# Patient Record
Sex: FEMALE | Race: WHITE | Employment: FULL TIME | URBAN - METROPOLITAN AREA
[De-identification: names, ages, dates, MRNs, and addresses within clinical notes are randomized per-mention and may not be internally consistent; named-entity substitution may affect disease eponyms.]

---

## 2017-08-03 ENCOUNTER — OFFICE VISIT (OUTPATIENT)
Dept: FAMILY MEDICINE CLINIC | Age: 37
End: 2017-08-03

## 2017-08-03 VITALS
BODY MASS INDEX: 40.22 KG/M2 | HEART RATE: 73 BPM | DIASTOLIC BLOOD PRESSURE: 76 MMHG | OXYGEN SATURATION: 98 % | TEMPERATURE: 98.7 F | HEIGHT: 63 IN | RESPIRATION RATE: 16 BRPM | WEIGHT: 227 LBS | SYSTOLIC BLOOD PRESSURE: 114 MMHG

## 2017-08-03 DIAGNOSIS — Z00.00 PHYSICAL EXAM: Primary | ICD-10-CM

## 2017-08-03 NOTE — PATIENT INSTRUCTIONS

## 2017-08-03 NOTE — PROGRESS NOTES
Eleni Mathew is a 42 y.o.  female and presents with screening physical. Hx of total hysterectomy for fibroids. Chief Complaint   Patient presents with   [address] [place] [address]     Subjective: Additional Concerns: none     Current Outpatient Prescriptions   Medication Sig Dispense Refill    therapeutic multivitamin (THERAGRAN) tablet Take 1 Tab by mouth daily. Patient agreed on 2013 to hold this medication until after surgery.  polyethylene glycol (MIRALAX) 17 gram packet Take 17 g by mouth as needed.  Indications: CONSTIPATION       Allergies   Allergen Reactions    Adhesive Tape-Silicones Rash    Pcn [Penicillins] Rash    Sulfa (Sulfonamide Antibiotics) Hives     Past Medical History:   Diagnosis Date    Chronic pain     abd pain, need surgery    Liver disease     acute hepatitis, jaundice, treated     Past Surgical History:   Procedure Laterality Date    HX GYN           HX ORTHOPAEDIC      (R) foot surg - bone spurs    HX OTHER SURGICAL      liver bx     Family History   Problem Relation Age of Onset    Elevated Lipids Mother     Hypertension Mother     Diabetes Mother     Heart Disease Father     Elevated Lipids Father     Hypertension Father     Stroke Maternal Grandmother     Heart Disease Paternal Grandmother     Heart Disease Paternal Grandfather     Alcohol abuse Paternal Grandfather      Social History   Substance Use Topics    Smoking status: Former Smoker    Smokeless tobacco: Not on file      Comment: smoking cess 10 years ago    Alcohol use No     ROS     General: negative for - chills, fatigue, fever, weight change  Psych: negative for - anxiety, depression, irritability or mood swings  ENT: negative for - headaches, hearing change, nasal congestion, oral lesions, sneezing or sore throat  Heme/ Lymph: negative for - bleeding problems, bruising, pallor or swollen lymph nodes  Endo: negative for - hot flashes, polydipsia/polyuria or temperature intolerance  Resp: negative for - cough, shortness of breath or wheezing  CV: negative for - chest pain, edema or palpitations  GI: negative for - abdominal pain, change in bowel habits, constipation, diarrhea or nausea/vomiting  : negative for - dysuria, hematuria, incontinence, pelvic pain or vulvar/vaginal symptoms  MSK: negative for - joint pain, joint swelling or muscle pain  Neuro: negative for - confusion, headaches, seizures or weakness  Derm: negative for - dry skin, hair changes, rash or skin lesion changes    Objective:  Vitals:    08/03/17 1626   BP: 114/76   Pulse: 73   Resp: 16   Temp: 98.7 °F (37.1 °C)   TempSrc: Oral   SpO2: 98%   Weight: 227 lb (103 kg)   Height: 5' 3\" (1.6 m)   PainSc:   0 - No pain   LMP: 06/15/2013     PE    alert, well appearing, and in no distress, oriented to person, place, and time, normal appearing weight and overweight  General appearance - alert, well appearing, and in no distress, oriented to person, place, and time and overweight  Mental status - alert, oriented to person, place, and time, normal mood, behavior, speech, dress, motor activity, and thought processes  Eyes - pupils equal and reactive, extraocular eye movements intact  Ears - bilateral TM's and external ear canals normal  Mouth - mucous membranes moist, pharynx normal without lesions  Neck - supple, no significant adenopathy  Chest - clear to auscultation, no wheezes, rales or rhonchi, symmetric air entry  Heart - normal rate, regular rhythm, normal S1, S2, no murmurs, rubs, clicks or gallops  Abdomen - soft, nontender, nondistended, no masses or organomegaly  Back exam - full range of motion, no tenderness, palpable spasm or pain on motion  Neurological - alert, oriented, normal speech, no focal findings or movement disorder noted  Musculoskeletal - no joint tenderness, deformity or swelling  Extremities - peripheral pulses normal, no pedal edema, no clubbing or cyanosis  Skin - normal coloration and turgor, no rashes, no suspicious skin lesions noted    LABS   No visits with results within 6 Month(s) from this visit. Latest known visit with results is:    Admission on 07/03/2013, Discharged on 07/04/2013   Component Date Value Ref Range Status    Pregnancy test,urine (POC) 07/03/2013 NEGATIVE   NEGATIVE Final    WBC 07/03/2013 15.3* 4.6 - 13.2 K/uL Final    RBC 07/03/2013 4.25  4.20 - 5.30 M/uL Final    HGB 07/03/2013 13.1  12.0 - 16.0 g/dL Final    HCT 07/03/2013 38.8  35.0 - 45.0 % Final    MCV 07/03/2013 91.3  74.0 - 97.0 FL Final    MCH 07/03/2013 30.8  24.0 - 34.0 PG Final    MCHC 07/03/2013 33.8  31.0 - 37.0 g/dL Final    RDW 07/03/2013 14.5  11.6 - 14.5 % Final    PLATELET 98/75/8132 618  135 - 420 K/uL Final    MPV 07/03/2013 9.8  9.2 - 11.8 FL Final       TESTS  Results for orders placed or performed during the hospital encounter of 07/03/13   CBC W/O DIFF   Result Value Ref Range    WBC 15.3 (H) 4.6 - 13.2 K/uL    RBC 4.25 4.20 - 5.30 M/uL    HGB 13.1 12.0 - 16.0 g/dL    HCT 38.8 35.0 - 45.0 %    MCV 91.3 74.0 - 97.0 FL    MCH 30.8 24.0 - 34.0 PG    MCHC 33.8 31.0 - 37.0 g/dL    RDW 14.5 11.6 - 14.5 %    PLATELET 658 766 - 653 K/uL    MPV 9.8 9.2 - 11.8 FL   HCG URINE, QL. - POC   Result Value Ref Range    Pregnancy test,urine (POC) NEGATIVE  NEGATIVE     Assessment/Plan:       Physical exam  - METABOLIC PANEL, COMPREHENSIVE; Future  - LIPID PANEL; Future  - CBC WITH AUTOMATED DIFF; Future  - TSH 3RD GENERATION; Future  - HEMOGLOBIN A1C WITH EAG; Future    Lab review: orders written for new lab studies as appropriate; see orders. We will call for results and further plan. I have discussed the diagnosis with the patient and the intended plan as seen in the above orders. The patient has received an after-visit summary and questions were answered concerning future plans. I have discussed medication side effects and warnings with the patient as well. I have reviewed the plan of care with the patient, accepted their input and they are in agreement with the treatment goals. Follow-up Disposition:  Return if symptoms worsen or fail to improve.     Astrid Jauregui MD

## 2017-08-03 NOTE — PROGRESS NOTES
Robinson Mckeon is a 42 y.o.  female and presents with need for screening physical. Patient had a full hysterectomy for   Bleeding fibroids. Chief Complaint   Patient presents with   [address] [place] [address]     Subjective: Additional Concerns: none    Current Outpatient Prescriptions   Medication Sig Dispense Refill    therapeutic multivitamin (THERAGRAN) tablet Take 1 Tab by mouth daily. Patient agreed on 2013 to hold this medication until after surgery.  polyethylene glycol (MIRALAX) 17 gram packet Take 17 g by mouth as needed.  Indications: CONSTIPATION       Allergies   Allergen Reactions    Adhesive Tape-Silicones Rash    Pcn [Penicillins] Rash    Sulfa (Sulfonamide Antibiotics) Hives     Past Medical History:   Diagnosis Date    Chronic pain     abd pain, need surgery    Liver disease     acute hepatitis, jaundice, treated     Past Surgical History:   Procedure Laterality Date    HX GYN           HX ORTHOPAEDIC      (R) foot surg - bone spurs    HX OTHER SURGICAL      liver bx     Family History   Problem Relation Age of Onset    Elevated Lipids Mother     Hypertension Mother     Diabetes Mother     Heart Disease Father     Elevated Lipids Father     Hypertension Father     Stroke Maternal Grandmother     Heart Disease Paternal Grandmother     Heart Disease Paternal Grandfather     Alcohol abuse Paternal Grandfather      Social History   Substance Use Topics    Smoking status: Former Smoker    Smokeless tobacco: Not on file      Comment: smoking cess 10 years ago    Alcohol use No     ROS     General: negative for - chills, fatigue, fever, weight change  Psych: negative for - anxiety, depression, irritability or mood swings  ENT: negative for - headaches, hearing change, nasal congestion, oral lesions, sneezing or sore throat  Heme/ Lymph: negative for - bleeding problems, bruising, pallor or swollen lymph nodes  Endo: negative for - hot flashes, polydipsia/polyuria or temperature intolerance  Resp: negative for - cough, shortness of breath or wheezing  CV: negative for - chest pain, edema or palpitations  GI: negative for - abdominal pain, change in bowel habits, constipation, diarrhea or nausea/vomiting  : negative for - dysuria, hematuria, incontinence, pelvic pain or vulvar/vaginal symptoms  MSK: negative for - joint pain, joint swelling or muscle pain  Neuro: negative for - confusion, headaches, seizures or weakness  Derm: negative for - dry skin, hair changes, rash or skin lesion changes    Objective:  Vitals:    08/03/17 1626   BP: 114/76   Pulse: 73   Resp: 16   Temp: 98.7 °F (37.1 °C)   TempSrc: Oral   SpO2: 98%   Weight: 227 lb (103 kg)   Height: 5' 3\" (1.6 m)   PainSc:   0 - No pain   LMP: 06/15/2013     PE    alert, well appearing, and in no distress, oriented to person, place, and time and overweight  Mental status - alert, oriented to person, place, and time, normal mood, behavior, speech, dress, motor activity, and thought processes  Eyes - pupils equal and reactive, extraocular eye movements intact  Ears - bilateral TM's and external ear canals normal  Mouth - mucous membranes moist, pharynx normal without lesions  Neck - supple, no significant adenopathy  Chest - clear to auscultation, no wheezes, rales or rhonchi, symmetric air entry  Heart - normal rate, regular rhythm, normal S1, S2, no murmurs, rubs, clicks or gallops  Abdomen - soft, nontender, nondistended, no masses or organomegaly  Back exam - full range of motion, no tenderness, palpable spasm or pain on motion  Neurological - alert, oriented, normal speech, no focal findings or movement disorder noted  Musculoskeletal - no joint tenderness, deformity or swelling  Extremities - peripheral pulses normal, no pedal edema, no clubbing or cyanosis  Skin - normal coloration and turgor, no rashes, no suspicious skin lesions noted    LABS   No visits with results within 6 Month(s) from this visit. Latest known visit with results is:    Admission on 07/03/2013, Discharged on 07/04/2013   Component Date Value Ref Range Status    Pregnancy test,urine (POC) 07/03/2013 NEGATIVE   NEGATIVE Final    WBC 07/03/2013 15.3* 4.6 - 13.2 K/uL Final    RBC 07/03/2013 4.25  4.20 - 5.30 M/uL Final    HGB 07/03/2013 13.1  12.0 - 16.0 g/dL Final    HCT 07/03/2013 38.8  35.0 - 45.0 % Final    MCV 07/03/2013 91.3  74.0 - 97.0 FL Final    MCH 07/03/2013 30.8  24.0 - 34.0 PG Final    MCHC 07/03/2013 33.8  31.0 - 37.0 g/dL Final    RDW 07/03/2013 14.5  11.6 - 14.5 % Final    PLATELET 80/42/1420 182  135 - 420 K/uL Final    MPV 07/03/2013 9.8  9.2 - 11.8 FL Final       TESTS  Results for orders placed or performed during the hospital encounter of 07/03/13   CBC W/O DIFF   Result Value Ref Range    WBC 15.3 (H) 4.6 - 13.2 K/uL    RBC 4.25 4.20 - 5.30 M/uL    HGB 13.1 12.0 - 16.0 g/dL    HCT 38.8 35.0 - 45.0 %    MCV 91.3 74.0 - 97.0 FL    MCH 30.8 24.0 - 34.0 PG    MCHC 33.8 31.0 - 37.0 g/dL    RDW 14.5 11.6 - 14.5 %    PLATELET 204 178 - 653 K/uL    MPV 9.8 9.2 - 11.8 FL   HCG URINE, QL. - POC   Result Value Ref Range    Pregnancy test,urine (POC) NEGATIVE  NEGATIVE     Assessment/Plan:      1. Physical exam - Labs as ordered. Lab review: orders written for new lab studies as appropriate; see orders. We will call patient for results and further plan if any. I have discussed the diagnosis with the patient and the intended plan as seen in the above orders. The patient has received an after-visit summary and questions were answered concerning future plans. I have discussed medication side effects and warnings with the patient as well. I have reviewed the plan of care with the patient, accepted their input and they are in agreement with the treatment goals.      F/U as needed    Sheri Jimenez MD

## 2017-08-08 ENCOUNTER — HOSPITAL ENCOUNTER (OUTPATIENT)
Dept: LAB | Age: 37
Discharge: HOME | End: 2017-08-08
Payer: OTHER GOVERNMENT

## 2017-08-08 DIAGNOSIS — Z00.00 PHYSICAL EXAM: ICD-10-CM

## 2017-08-08 LAB
ALBUMIN SERPL BCP-MCNC: 3.3 G/DL (ref 3.4–5)
ALBUMIN/GLOB SERPL: 0.8 {RATIO} (ref 0.8–1.7)
ALP SERPL-CCNC: 80 U/L (ref 45–117)
ALT SERPL-CCNC: 63 U/L (ref 13–56)
ANION GAP BLD CALC-SCNC: 7 MMOL/L (ref 3–18)
AST SERPL W P-5'-P-CCNC: 38 U/L (ref 15–37)
BASOPHILS # BLD AUTO: 0 K/UL (ref 0–0.06)
BASOPHILS # BLD: 0 % (ref 0–2)
BILIRUB SERPL-MCNC: 0.7 MG/DL (ref 0.2–1)
BUN SERPL-MCNC: 9 MG/DL (ref 7–18)
BUN/CREAT SERPL: 12 (ref 12–20)
CALCIUM SERPL-MCNC: 9 MG/DL (ref 8.5–10.1)
CHLORIDE SERPL-SCNC: 106 MMOL/L (ref 100–108)
CHOLEST SERPL-MCNC: 176 MG/DL
CO2 SERPL-SCNC: 27 MMOL/L (ref 21–32)
CREAT SERPL-MCNC: 0.76 MG/DL (ref 0.6–1.3)
DIFFERENTIAL METHOD BLD: NORMAL
EOSINOPHIL # BLD: 0.2 K/UL (ref 0–0.4)
EOSINOPHIL NFR BLD: 3 % (ref 0–5)
ERYTHROCYTE [DISTWIDTH] IN BLOOD BY AUTOMATED COUNT: 13.4 % (ref 11.6–14.5)
EST. AVERAGE GLUCOSE BLD GHB EST-MCNC: 114 MG/DL
GLOBULIN SER CALC-MCNC: 3.9 G/DL (ref 2–4)
GLUCOSE SERPL-MCNC: 89 MG/DL (ref 74–99)
HBA1C MFR BLD: 5.6 % (ref 4.2–5.6)
HCT VFR BLD AUTO: 40.8 % (ref 35–45)
HDLC SERPL-MCNC: 57 MG/DL (ref 40–60)
HDLC SERPL: 3.1 {RATIO} (ref 0–5)
HGB BLD-MCNC: 13.2 G/DL (ref 12–16)
LDLC SERPL CALC-MCNC: 95.6 MG/DL (ref 0–100)
LIPID PROFILE,FLP: NORMAL
LYMPHOCYTES # BLD AUTO: 36 % (ref 21–52)
LYMPHOCYTES # BLD: 2.9 K/UL (ref 0.9–3.6)
MCH RBC QN AUTO: 28.4 PG (ref 24–34)
MCHC RBC AUTO-ENTMCNC: 32.4 G/DL (ref 31–37)
MCV RBC AUTO: 87.9 FL (ref 74–97)
MONOCYTES # BLD: 0.7 K/UL (ref 0.05–1.2)
MONOCYTES NFR BLD AUTO: 9 % (ref 3–10)
NEUTS SEG # BLD: 4.1 K/UL (ref 1.8–8)
NEUTS SEG NFR BLD AUTO: 52 % (ref 40–73)
PLATELET # BLD AUTO: 254 K/UL (ref 135–420)
PMV BLD AUTO: 10.3 FL (ref 9.2–11.8)
POTASSIUM SERPL-SCNC: 3.8 MMOL/L (ref 3.5–5.5)
PROT SERPL-MCNC: 7.2 G/DL (ref 6.4–8.2)
RBC # BLD AUTO: 4.64 M/UL (ref 4.2–5.3)
SODIUM SERPL-SCNC: 140 MMOL/L (ref 136–145)
TRIGL SERPL-MCNC: 117 MG/DL (ref ?–150)
TSH SERPL DL<=0.05 MIU/L-ACNC: 1.93 UIU/ML (ref 0.36–3.74)
VLDLC SERPL CALC-MCNC: 23.4 MG/DL
WBC # BLD AUTO: 7.9 K/UL (ref 4.6–13.2)

## 2017-08-08 PROCEDURE — 83036 HEMOGLOBIN GLYCOSYLATED A1C: CPT | Performed by: FAMILY MEDICINE

## 2017-08-08 PROCEDURE — 80053 COMPREHEN METABOLIC PANEL: CPT | Performed by: FAMILY MEDICINE

## 2017-08-08 PROCEDURE — 85025 COMPLETE CBC W/AUTO DIFF WBC: CPT | Performed by: FAMILY MEDICINE

## 2017-08-08 PROCEDURE — 84443 ASSAY THYROID STIM HORMONE: CPT | Performed by: FAMILY MEDICINE

## 2017-08-08 PROCEDURE — 80061 LIPID PANEL: CPT | Performed by: FAMILY MEDICINE

## 2017-08-11 NOTE — PROGRESS NOTES
Pls inform patient labs normal to near normal lg liver enzymes almost normal now. F/U in 6-12 months.

## 2017-08-17 ENCOUNTER — TELEPHONE (OUTPATIENT)
Dept: FAMILY MEDICINE CLINIC | Age: 37
End: 2017-08-17

## 2017-08-17 NOTE — TELEPHONE ENCOUNTER
----- Message from David Rosen MD sent at 8/11/2017  8:42 AM EDT -----  Pls inform patient labs normal to near normal lg liver enzymes almost normal now. F/U in 6-12 months.

## 2018-09-04 ENCOUNTER — HOSPITAL ENCOUNTER (OUTPATIENT)
Dept: LAB | Age: 38
Discharge: HOME | End: 2018-09-04
Payer: OTHER GOVERNMENT

## 2018-09-04 ENCOUNTER — OFFICE VISIT (OUTPATIENT)
Dept: FAMILY MEDICINE CLINIC | Age: 38
End: 2018-09-04

## 2018-09-04 VITALS
WEIGHT: 239 LBS | DIASTOLIC BLOOD PRESSURE: 80 MMHG | BODY MASS INDEX: 42.35 KG/M2 | HEIGHT: 63 IN | SYSTOLIC BLOOD PRESSURE: 104 MMHG | OXYGEN SATURATION: 96 % | TEMPERATURE: 97.3 F | RESPIRATION RATE: 16 BRPM | HEART RATE: 82 BPM

## 2018-09-04 DIAGNOSIS — Z00.00 ANNUAL PHYSICAL EXAM: ICD-10-CM

## 2018-09-04 DIAGNOSIS — Z00.00 ANNUAL PHYSICAL EXAM: Primary | ICD-10-CM

## 2018-09-04 PROBLEM — E66.01 OBESITY, MORBID (HCC): Status: ACTIVE | Noted: 2018-09-04

## 2018-09-04 LAB
ALBUMIN SERPL-MCNC: 3.5 G/DL (ref 3.4–5)
ALBUMIN/GLOB SERPL: 0.9 {RATIO} (ref 0.8–1.7)
ALP SERPL-CCNC: 91 U/L (ref 45–117)
ALT SERPL-CCNC: 46 U/L (ref 13–56)
ANION GAP SERPL CALC-SCNC: 7 MMOL/L (ref 3–18)
AST SERPL-CCNC: 29 U/L (ref 15–37)
BILIRUB SERPL-MCNC: 0.6 MG/DL (ref 0.2–1)
BUN SERPL-MCNC: 11 MG/DL (ref 7–18)
BUN/CREAT SERPL: 14 (ref 12–20)
CALCIUM SERPL-MCNC: 8.6 MG/DL (ref 8.5–10.1)
CHLORIDE SERPL-SCNC: 107 MMOL/L (ref 100–108)
CHOLEST SERPL-MCNC: 197 MG/DL
CO2 SERPL-SCNC: 27 MMOL/L (ref 21–32)
CREAT SERPL-MCNC: 0.78 MG/DL (ref 0.6–1.3)
ERYTHROCYTE [DISTWIDTH] IN BLOOD BY AUTOMATED COUNT: 13.7 % (ref 11.6–14.5)
GLOBULIN SER CALC-MCNC: 4 G/DL (ref 2–4)
GLUCOSE SERPL-MCNC: 87 MG/DL (ref 74–99)
HCT VFR BLD AUTO: 40.5 % (ref 35–45)
HDLC SERPL-MCNC: 55 MG/DL (ref 40–60)
HDLC SERPL: 3.6 {RATIO} (ref 0–5)
HGB BLD-MCNC: 13.3 G/DL (ref 12–16)
LDLC SERPL CALC-MCNC: 122 MG/DL (ref 0–100)
LIPID PROFILE,FLP: ABNORMAL
MCH RBC QN AUTO: 29.2 PG (ref 24–34)
MCHC RBC AUTO-ENTMCNC: 32.8 G/DL (ref 31–37)
MCV RBC AUTO: 89 FL (ref 74–97)
PLATELET # BLD AUTO: 256 K/UL (ref 135–420)
PMV BLD AUTO: 10.1 FL (ref 9.2–11.8)
POTASSIUM SERPL-SCNC: 4 MMOL/L (ref 3.5–5.5)
PROT SERPL-MCNC: 7.5 G/DL (ref 6.4–8.2)
RBC # BLD AUTO: 4.55 M/UL (ref 4.2–5.3)
SODIUM SERPL-SCNC: 141 MMOL/L (ref 136–145)
TRIGL SERPL-MCNC: 100 MG/DL (ref ?–150)
VLDLC SERPL CALC-MCNC: 20 MG/DL
WBC # BLD AUTO: 7.7 K/UL (ref 4.6–13.2)

## 2018-09-04 PROCEDURE — 80053 COMPREHEN METABOLIC PANEL: CPT | Performed by: PHYSICIAN ASSISTANT

## 2018-09-04 PROCEDURE — 36415 COLL VENOUS BLD VENIPUNCTURE: CPT | Performed by: PHYSICIAN ASSISTANT

## 2018-09-04 PROCEDURE — 80061 LIPID PANEL: CPT | Performed by: PHYSICIAN ASSISTANT

## 2018-09-04 PROCEDURE — 85027 COMPLETE CBC AUTOMATED: CPT | Performed by: PHYSICIAN ASSISTANT

## 2018-09-04 NOTE — MR AVS SNAPSHOT
[address] [place] [id number] 
[phone] Patient: Devante Gordon MRN: [id number] DQT:[birth year] Visit Information Date & Time Provider Department Dept. Phone Encounter #  
 9/4/2018  8:30 AM Mian Santos, [address] [facility] [phone] [id number] Upcoming Health Maintenance Date Due DTaP/Tdap/Td series (1 - Tdap) 8/3/1999 PAP AKA CERVICAL CYTOLOGY 8/3/1999 Influenza Age 5 to Adult 8/1/2018 Allergies as of 9/4/2018  Review Complete On: 8/3/2017 By: Katherin Lockett LPN Severity Noted Reaction Type Reactions Adhesive Tape-silicones  25/85/8303   Intolerance Rash Pcn [Penicillins]  06/26/2013   Side Effect Rash  
 Sulfa (Sulfonamide Antibiotics)  06/26/2013   Side Effect Hives Current Immunizations  Never Reviewed No immunizations on file. Not reviewed this visit You Were Diagnosed With   
  
 Codes Comments Annual physical exam    -  Primary ICD-10-CM: Z00.00 ICD-9-CM: V70.0 Vitals LMP OB Status Smoking Status 06/16/2013 Hysterectomy Former Smoker Your Updated Medication List  
  
   
This list is accurate as of 9/4/18  9:12 AM.  Always use your most recent med list.  
  
  
  
  
 Freddie Deck 17 gram packet Generic drug:  polyethylene glycol Take 17 g by mouth as needed. Indications: CONSTIPATION  
  
 therapeutic multivitamin tablet Commonly known as:  [facility] Take 1 Tab by mouth daily. Patient agreed on June 26, 2013 to hold this medication until after surgery. To-Do List   
 09/04/2018 Lab:  CBC W/O DIFF   
  
 09/04/2018 Lab:  LIPID PANEL   
  
 09/04/2018 Lab:  METABOLIC PANEL, COMPREHENSIVE Introducing [facility]! Dear Latonya: 
Thank you for requesting a [facility] account.   Our records indicate that you already have an active [facility] account. You can access your account anytime at [email or web address] Did you know that you can access your hospital and ER discharge instructions at any time in [facility]? You can also review all of your test results from your hospital stay or ER visit. Additional Information If you have questions, please visit the Frequently Asked Questions section of the [facility] website at [email or web address] Remember, [facility] is NOT to be used for urgent needs. For medical emergencies, dial 911. Now available from your iPhone and Android! Please provide this summary of care documentation to your next provider. Your primary care clinician is listed as Haydee Carias. If you have any questions after today's visit, please call [phone].

## 2018-09-04 NOTE — PROGRESS NOTES
History of Present Illness Patient presents for complete physical and has no complaints. Denies any pertinent PMH, not on any daily medications, or had any recent illnesses. Denies any CP or SOB with physical activity. Past Medical History:  
Diagnosis Date  Chronic pain   
 abd pain, need surgery  Liver disease   
 acute hepatitis, jaundice, treated Current Outpatient Prescriptions on File Prior to Visit Medication Sig Dispense Refill  therapeutic multivitamin (THERAGRAN) tablet Take 1 Tab by mouth daily. Patient agreed on June 26, 2013 to hold this medication until after surgery.  polyethylene glycol (MIRALAX) 17 gram packet Take 17 g by mouth as needed. Indications: CONSTIPATION No current facility-administered medications on file prior to visit. Review of Systems General: negative for - chills, fatigue or fever Psychological: negative for - anxiety or depression Ophthalmic: negative for - double vision, photophobia or uses glasses ENT: negative for - epistaxis, headaches, vertigo or visual changes Hematological and Lymphatic: negative for - bleeding problems, night sweats or swollen lymph nodes Endocrine: negative for - breast changes, malaise/lethargy, palpitations or polydipsia/polyuria Respiratory: no cough, shortness of breath, or wheezing Cardiovascular: no chest pain or dyspnea on exertion Gastrointestinal: no abdominal pain, change in bowel habits Genito-Urinary: no dysuria, trouble voiding, or hematuria Musculoskeletal: negative for - muscular weakness Dermatological: negative for - eczema or rash Objective Visit Vitals  /80 (BP 1 Location: Left arm, BP Patient Position: Sitting)  Pulse 82  Temp 97.3 °F (36.3 °C) (Oral)  Resp 16  
 Ht 5' 3\" (1.6 m)  Wt 239 lb (108.4 kg)  LMP 06/16/2013  SpO2 96%  BMI 42.34 kg/m2 Physical Exam 
Physical Examination: General appearance - alert, well appearing, and in no distress Mental status - alert, oriented to person, place, and time Eyes - pupils equal and reactive, extraocular eye movements intact Ears - bilateral TM's and external ear canals normal 
Nose - normal and patent, no erythema, discharge or polyps Mouth - mucous membranes moist, pharynx normal without lesions Neck - supple, no significant adenopathy Chest - clear to auscultation, no wheezes, rales or rhonchi, symmetric air entry Heart - normal rate, regular rhythm, normal S1, S2, no murmurs, rubs, clicks or gallops Abdomen - soft, nontender, nondistended, no masses or organomegaly Neurological - alert, oriented, normal speech, no focal findings or movement disorder noted Musculoskeletal - no joint tenderness, deformity or swelling, DTRs 2/3 bilaterally Extremities - peripheral pulses normal, no pedal edema, no clubbing or cyanosis Skin - normal coloration and turgor, no rashes, no suspicious skin lesions noted Diagnoses and all orders for this visit: 
 
1. Annual physical exam 
-     CBC W/O DIFF; Future -     METABOLIC PANEL, COMPREHENSIVE; Future -     LIPID PANEL; Future Patient appears to be in excellent health and there were no abnormalities on exam.  She is to return for her annual exam in one year.